# Patient Record
Sex: FEMALE | Race: BLACK OR AFRICAN AMERICAN | NOT HISPANIC OR LATINO | URBAN - METROPOLITAN AREA
[De-identification: names, ages, dates, MRNs, and addresses within clinical notes are randomized per-mention and may not be internally consistent; named-entity substitution may affect disease eponyms.]

---

## 2019-04-25 ENCOUNTER — OFFICE (OUTPATIENT)
Dept: URBAN - METROPOLITAN AREA CLINIC 78 | Facility: CLINIC | Age: 29
End: 2019-04-25
Payer: COMMERCIAL

## 2019-04-25 VITALS
TEMPERATURE: 98 F | DIASTOLIC BLOOD PRESSURE: 83 MMHG | WEIGHT: 265 LBS | HEART RATE: 86 BPM | HEIGHT: 63 IN | SYSTOLIC BLOOD PRESSURE: 127 MMHG

## 2019-04-25 DIAGNOSIS — K21.9 GASTRO-ESOPHAGEAL REFLUX DISEASE WITHOUT ESOPHAGITIS: ICD-10-CM

## 2019-04-25 DIAGNOSIS — K58.9 IRRITABLE BOWEL SYNDROME WITHOUT DIARRHEA: ICD-10-CM

## 2019-04-25 DIAGNOSIS — K62.5 HEMORRHAGE OF ANUS AND RECTUM: ICD-10-CM

## 2019-04-25 DIAGNOSIS — R19.7 DIARRHEA, UNSPECIFIED: ICD-10-CM

## 2019-04-25 PROCEDURE — 99205 OFFICE O/P NEW HI 60 MIN: CPT

## 2019-04-25 RX ORDER — OMEPRAZOLE 40 MG/1
CAPSULE, DELAYED RELEASE ORAL
Qty: 90 | Refills: 0 | Status: ACTIVE
Start: 2019-04-25

## 2019-04-25 NOTE — SERVICEHPINOTES
CAROL ANN BAUER   is a   29   year old    female who is being seen in consultation at the request of   OPHELIA WALKER   to establish GI care. She has PMH IBS, GERD, vertigo, migraines, obesity, kidney stones, asthma, anxiety, depression, allergies, sleep apnea. She moved to the area last year from [place]. She says that she is not sure if she has Crohn's but has a strong family history of this (her mother and 2 cousins). Patient says she started having more GI symptoms in 2014 around age 24 - was having more constipation and bad acid reflux. She had GI evaluation. Reports a blood test that showed possible Crohn's. Was put on Align probiotic and high dose omeprazole. In 2017, after she had a baby, she was having excessive post-prandial diarrhea alternating with constipation. Reports negative EGD and colonoscopy at that time. Was put on Prevalite. She reports 2 prior colonoscopies (2016, 2018), apparently ok. She currently is needing a refill of her omeprazole 40 mg for GERD which she takes once a day. She uses prednisone PRN asthma flare-ups. Last week she had 2 episodes of blood per rectum. Stool was hard. No rectal pain. Denies fevers, weight loss. Reports routine labs with PCP.

## 2019-06-19 ENCOUNTER — OFFICE (OUTPATIENT)
Dept: URBAN - METROPOLITAN AREA CLINIC 78 | Facility: CLINIC | Age: 29
End: 2019-06-19
Payer: COMMERCIAL

## 2019-06-19 VITALS
TEMPERATURE: 97.5 F | HEIGHT: 63 IN | WEIGHT: 273 LBS | HEART RATE: 79 BPM | SYSTOLIC BLOOD PRESSURE: 129 MMHG | DIASTOLIC BLOOD PRESSURE: 101 MMHG

## 2019-06-19 DIAGNOSIS — K58.9 IRRITABLE BOWEL SYNDROME WITHOUT DIARRHEA: ICD-10-CM

## 2019-06-19 DIAGNOSIS — K21.9 GASTRO-ESOPHAGEAL REFLUX DISEASE WITHOUT ESOPHAGITIS: ICD-10-CM

## 2019-06-19 PROCEDURE — 99213 OFFICE O/P EST LOW 20 MIN: CPT

## 2019-06-19 NOTE — SERVICEHPINOTES
30 yo CATHIE presents for f/u acid reflux and constipation. She had presented in April as a new patient to establish GI care. She has PMH IBS, GERD, vertigo, migraines, obesity, kidney stones, asthma, anxiety, depression, allergies, sleep apnea. She moved to the area last year from [place]. We were able to obtain her GI records including a colonoscopy, MR enterography, and IBD serology panel from 2018 - all of which were negative for IBD. Her EGD showed a small hiatal hernia but was otherwise unremarkable. She continues on omeprazole 40 mg daily with control of GERD. Uses cholestyramine (Prevalite) for IBS (s/p michelle in past). No new concerns today. She had been given order for fecal calpro at last visit which she hasn't done yet. No further rectal bleeding. Has had high stress levels interviewing for jobs and taking care of her daughter who has been sick recently.  Prior hx: Reports a strong family history of Crohn's (her mother and 2 cousins). Patient says she started having more GI symptoms in 2014 around age 24 - was having more constipation and bad acid reflux. She had GI evaluation. Reports a blood test that showed possible Crohn's. Was put on Align probiotic and high dose omeprazole. In 2017, after she had a baby, she was having excessive post-prandial diarrhea alternating with constipation. Reports negative EGD and colonoscopy at that time. Was put on Prevalite - helpful. Prior trial of IBgard not helpful. She reports 2 prior colonoscopies (2016, 2018), apparently ok. She uses prednisone PRN asthma flare-ups. Denies fevers, weight loss.

## 2020-06-25 ENCOUNTER — OFFICE (OUTPATIENT)
Dept: URBAN - METROPOLITAN AREA CLINIC 79 | Facility: CLINIC | Age: 30
End: 2020-06-25
Payer: COMMERCIAL

## 2020-06-25 VITALS — HEIGHT: 63 IN | WEIGHT: 257 LBS

## 2020-06-25 DIAGNOSIS — K21.9 GASTRO-ESOPHAGEAL REFLUX DISEASE WITHOUT ESOPHAGITIS: ICD-10-CM

## 2020-06-25 PROCEDURE — 99213 OFFICE O/P EST LOW 20 MIN: CPT | Mod: 95 | Performed by: PHYSICIAN ASSISTANT

## 2020-06-25 NOTE — SERVICENOTES
Patient's visit was conducted through video telecommunication. Patient consented before the start of visit as to understanding of privacy concerns, possible technological failure, and their responsibility of carrying out instructions of plan.

I have reviewed the history, physical exam, assessment and management plans.  I concur with or have edited all elements of her note.

## 2020-06-25 NOTE — SERVICEHPINOTES
PATIENT VERIFIED BY DATE OF BIRTH AND NAME. Patient has been consented for this telecommunication visit. 29 yo black female presents for f/u acid reflux. She has PMH IBS, GERD, vertigo, migraines, obesity, kidney stones, asthma, anxiety, depression, allergies, sleep apnea. She continues on omeprazole 40 mg daily with control of GERD. She used to be on 80 mg. She does not have sufficient relief on dosing lower than 40 mg. Has used cholestyramine (Prevalite) as needed for IBS (s/p michelle in past). Doing well overall. She denies N/V, abdominal pain, fevers, or weight loss. She has chronic tendency for some dysphagia which she states is stable. Has had EGD for this in the past. She has lost about 20 pounds by not eating out. ROS today is positive for reflux, allergies (sneezing, wheezing, post-nasal drip). Prior hx: She moved to the area in 2018 from [place]. We were able to obtain her GI records including a colonoscopy, MR enterography, and IBD serology panel from 2018 - all of which were negative for IBD. Her EGD showed a small hiatal hernia but was otherwise unremarkableReports a strong family history of Crohn's (her mother and 2 cousins). Patient says she started having more GI symptoms in 2014 around age 24 - was having more constipation and bad acid reflux. She had GI evaluation. Reports a blood test that showed possible Crohn's. Was put on Align probiotic and high dose omeprazole. In 2017, after she had a baby, she was having excessive post-prandial diarrhea alternating with constipation. Reports negative EGD and colonoscopy at that time. Was put on Prevalite - helpful. Prior trial of IBgard not helpful.